# Patient Record
Sex: FEMALE | Race: WHITE | NOT HISPANIC OR LATINO | Employment: FULL TIME | URBAN - METROPOLITAN AREA
[De-identification: names, ages, dates, MRNs, and addresses within clinical notes are randomized per-mention and may not be internally consistent; named-entity substitution may affect disease eponyms.]

---
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
## 2025-01-21 ENCOUNTER — APPOINTMENT (OUTPATIENT)
Dept: OBSTETRICS AND GYNECOLOGY | Facility: CLINIC | Age: 20
End: 2025-01-21
Payer: COMMERCIAL

## 2025-01-21 VITALS
SYSTOLIC BLOOD PRESSURE: 122 MMHG | DIASTOLIC BLOOD PRESSURE: 81 MMHG | WEIGHT: 193 LBS | BODY MASS INDEX: 32.95 KG/M2 | HEIGHT: 64 IN

## 2025-01-21 DIAGNOSIS — Z11.3 ROUTINE SCREENING FOR STI (SEXUALLY TRANSMITTED INFECTION): ICD-10-CM

## 2025-01-21 DIAGNOSIS — N92.0 MENORRHAGIA WITH REGULAR CYCLE: Primary | ICD-10-CM

## 2025-01-21 DIAGNOSIS — N94.6 DYSMENORRHEA: ICD-10-CM

## 2025-01-21 DIAGNOSIS — N94.10 DYSPAREUNIA IN FEMALE: ICD-10-CM

## 2025-01-21 DIAGNOSIS — N76.0 ACUTE VAGINITIS: ICD-10-CM

## 2025-01-21 PROCEDURE — 87491 CHLMYD TRACH DNA AMP PROBE: CPT

## 2025-01-21 PROCEDURE — 87661 TRICHOMONAS VAGINALIS AMPLIF: CPT

## 2025-01-21 PROCEDURE — 1036F TOBACCO NON-USER: CPT | Performed by: ADVANCED PRACTICE MIDWIFE

## 2025-01-21 PROCEDURE — 87591 N.GONORRHOEAE DNA AMP PROB: CPT

## 2025-01-21 PROCEDURE — 3008F BODY MASS INDEX DOCD: CPT | Performed by: ADVANCED PRACTICE MIDWIFE

## 2025-01-21 PROCEDURE — 87205 SMEAR GRAM STAIN: CPT

## 2025-01-21 PROCEDURE — 99203 OFFICE O/P NEW LOW 30 MIN: CPT | Performed by: ADVANCED PRACTICE MIDWIFE

## 2025-01-21 RX ORDER — ETONOGESTREL AND ETHINYL ESTRADIOL VAGINAL RING .015; .12 MG/D; MG/D
RING VAGINAL
Qty: 3 EACH | Refills: 4 | Status: CANCELLED | OUTPATIENT
Start: 2025-01-21

## 2025-01-21 NOTE — PROGRESS NOTES
"Assessment/Plan   Nela was seen today for annual exam.  Diagnoses and all orders for this visit:  Menorrhagia with regular cycle  Routine screening for STI (sexually transmitted infection)  -     C. trachomatis / N. gonorrhoeae, Amplified  -     Trichomonas vaginalis, Amplified  Dyspareunia in female  -     Referral to Physical Therapy; Future  Acute vaginitis  -     Vaginitis Gram Stain For Bacterial Vaginosis + Yeast    Discussed treatments for menorrhagia and dysmenorrhea, including lifestyle modifications, ibuprofen 600mg q 6hrs, hormonal contraceptives  Explored progesterone only hormonal contraceptives as migraines worsen on CHCs, pt is considering the IUD    Dyspareunia, rule out vaginitis and STI, recommended and pt accepts pelvic floor PT    Follow up per results  RTC annual exam or sooner PRN  Due for initial PAP age 19yo    Subjective   Nela Young is a 19 y.o. female who presents for Annual/establish care    Heavy bleeding with periods - pads soaks q2-3 hours x4 days, bleeds 10 days  Associated menstrual symptoms: pain and headaches  Home therapies: heating pads, sometimes will use pain medication bc doesn't work that well  Hx migraines, worse with CHC birth control, usually with periods but sometimes independent of cycle  Pain with intercourse, past 2 years with same partner; prior was not pleasant but not consistent either; use lubrication    Menstrual History:  OB History          0    Para   0    Term   0       0    AB   0    Living   0         SAB   0    IAB   0    Ectopic   0    Multiple   0    Live Births   0               Patient's last menstrual period was 2025.         ROS as in HPI    Objective   /81 (BP Location: Right arm, Patient Position: Sitting, BP Cuff Size: Adult)   Ht 1.626 m (5' 4\")   Wt 87.5 kg (193 lb)   LMP 2025   BMI 33.13 kg/m²     Physical Exam  Pulmonary:      Effort: Pulmonary effort is normal.   Abdominal:      Tenderness: There is no " abdominal tenderness.   Genitourinary:     General: Normal vulva.      Vagina: Normal.      Cervix: Normal.      Uterus: Normal.       Adnexa: Right adnexa normal and left adnexa normal.   Neurological:      Mental Status: She is alert.

## 2025-01-22 LAB — T VAGINALIS RRNA SPEC QL NAA+PROBE: NEGATIVE

## 2025-01-23 LAB
C TRACH RRNA SPEC QL NAA+PROBE: NEGATIVE
CLUE CELLS VAG LPF-#/AREA: NORMAL /[LPF]
N GONORRHOEA DNA SPEC QL PROBE+SIG AMP: NEGATIVE
NUGENT SCORE: 0
YEAST VAG WET PREP-#/AREA: NORMAL

## 2025-02-25 ENCOUNTER — APPOINTMENT (OUTPATIENT)
Dept: PRIMARY CARE | Facility: CLINIC | Age: 20
End: 2025-02-25
Payer: COMMERCIAL

## 2025-02-25 VITALS
HEIGHT: 65 IN | DIASTOLIC BLOOD PRESSURE: 72 MMHG | HEART RATE: 81 BPM | RESPIRATION RATE: 16 BRPM | TEMPERATURE: 96.6 F | OXYGEN SATURATION: 98 % | SYSTOLIC BLOOD PRESSURE: 112 MMHG | BODY MASS INDEX: 30.66 KG/M2 | WEIGHT: 184 LBS

## 2025-02-25 DIAGNOSIS — Z83.3 FAMILY HISTORY OF DIABETES MELLITUS: ICD-10-CM

## 2025-02-25 DIAGNOSIS — Z00.00 WELL ADULT EXAM: Primary | ICD-10-CM

## 2025-02-25 DIAGNOSIS — R53.83 FATIGUE, UNSPECIFIED TYPE: ICD-10-CM

## 2025-02-25 PROCEDURE — 99395 PREV VISIT EST AGE 18-39: CPT | Performed by: FAMILY MEDICINE

## 2025-02-25 ASSESSMENT — ENCOUNTER SYMPTOMS
HEADACHES: 0
POLYPHAGIA: 0
VOMITING: 0
FATIGUE: 0
POLYDIPSIA: 0
CHEST TIGHTNESS: 0
NUMBNESS: 0
NAUSEA: 0
MYALGIAS: 0
ARTHRALGIAS: 0
DIZZINESS: 0
DIARRHEA: 0
WEAKNESS: 0
APPETITE CHANGE: 0
SHORTNESS OF BREATH: 0
FREQUENCY: 0

## 2025-02-25 ASSESSMENT — PATIENT HEALTH QUESTIONNAIRE - PHQ9
SUM OF ALL RESPONSES TO PHQ9 QUESTIONS 1 AND 2: 0
2. FEELING DOWN, DEPRESSED OR HOPELESS: NOT AT ALL
1. LITTLE INTEREST OR PLEASURE IN DOING THINGS: NOT AT ALL

## 2025-02-25 NOTE — PROGRESS NOTES
"Patient reported health Good    Regular Dental Visits no    Regular Eye Visits yes    Hearing Loss no    Balanced Diet yes    Weight Concerns  gain of 60 pds in the last 6 months    Exercise Irregular     Subjective   Patient ID: Nela Young is a 19 y.o. female who presents for Annual Exam.    HPI     Review of Systems   Constitutional:  Negative for appetite change and fatigue.   Eyes:  Negative for visual disturbance.   Respiratory:  Negative for chest tightness and shortness of breath.    Cardiovascular:  Negative for chest pain and leg swelling.   Gastrointestinal:  Negative for diarrhea, nausea and vomiting.   Endocrine: Negative for polydipsia, polyphagia and polyuria.   Genitourinary:  Negative for frequency and urgency.   Musculoskeletal:  Negative for arthralgias and myalgias.   Neurological:  Negative for dizziness, syncope, weakness, numbness and headaches.       Objective   /72   Pulse 81   Temp 35.9 °C (96.6 °F)   Resp 16   Ht 1.638 m (5' 4.5\")   Wt 83.5 kg (184 lb)   SpO2 98%   BMI 31.10 kg/m²     Physical Exam  Constitutional:       Appearance: Normal appearance.   HENT:      Head: Normocephalic.      Right Ear: Tympanic membrane, ear canal and external ear normal.      Left Ear: Tympanic membrane, ear canal and external ear normal.      Nose: Nose normal.      Mouth/Throat:      Mouth: Mucous membranes are moist.      Pharynx: Oropharynx is clear.   Eyes:      Conjunctiva/sclera: Conjunctivae normal.   Cardiovascular:      Rate and Rhythm: Normal rate and regular rhythm.   Pulmonary:      Effort: Pulmonary effort is normal.      Breath sounds: Normal breath sounds.   Abdominal:      Tenderness: There is no abdominal tenderness.   Musculoskeletal:         General: Normal range of motion.      Cervical back: Neck supple.   Skin:     General: Skin is warm and dry.   Neurological:      General: No focal deficit present.      Mental Status: She is alert and oriented to person, place, and " time.   Psychiatric:         Mood and Affect: Mood normal.         Assessment/Plan   Problem List Items Addressed This Visit    None  Visit Diagnoses         Codes    Well adult exam    -  Primary Z00.00    Relevant Orders    Follow Up In [facility] - PCP    CBC and Auto Differential    Comprehensive Metabolic Panel    Hemoglobin A1C    Lipid Panel    Thyroid Stimulating Hormone    T4, free    Fatigue, unspecified type     R53.83    Relevant Orders    Follow Up In [facility] - PCP    CBC and Auto Differential    Comprehensive Metabolic Panel    Hemoglobin A1C    Lipid Panel    Thyroid Stimulating Hormone    T4, free    Family history of diabetes mellitus     Z83.3    Relevant Orders    Follow Up In [facility] - PCP    CBC and Auto Differential    Comprehensive Metabolic Panel    Hemoglobin A1C    Lipid Panel    Thyroid Stimulating Hormone    T4, free

## 2025-04-06 LAB
ALBUMIN SERPL-MCNC: 4.5 G/DL (ref 3.6–5.1)
ALP SERPL-CCNC: 101 U/L (ref 31–125)
ALT SERPL-CCNC: 18 U/L (ref 6–29)
ANION GAP SERPL CALCULATED.4IONS-SCNC: 8 MMOL/L (CALC) (ref 7–17)
AST SERPL-CCNC: 18 U/L (ref 10–30)
BASOPHILS # BLD AUTO: 29 CELLS/UL (ref 0–200)
BASOPHILS NFR BLD AUTO: 0.4 %
BILIRUB SERPL-MCNC: 0.3 MG/DL (ref 0.2–1.2)
BUN SERPL-MCNC: 13 MG/DL (ref 7–25)
CALCIUM SERPL-MCNC: 9.8 MG/DL (ref 8.6–10.2)
CHLORIDE SERPL-SCNC: 105 MMOL/L (ref 98–110)
CHOLEST SERPL-MCNC: 141 MG/DL
CHOLEST/HDLC SERPL: 2.6 (CALC)
CO2 SERPL-SCNC: 26 MMOL/L (ref 20–32)
CREAT SERPL-MCNC: 0.69 MG/DL (ref 0.5–0.96)
EGFRCR SERPLBLD CKD-EPI 2021: 127 ML/MIN/1.73M2
EOSINOPHIL # BLD AUTO: 212 CELLS/UL (ref 15–500)
EOSINOPHIL NFR BLD AUTO: 2.9 %
ERYTHROCYTE [DISTWIDTH] IN BLOOD BY AUTOMATED COUNT: 13.1 % (ref 11–15)
EST. AVERAGE GLUCOSE BLD GHB EST-MCNC: 111 MG/DL
EST. AVERAGE GLUCOSE BLD GHB EST-SCNC: 6.2 MMOL/L
GLUCOSE SERPL-MCNC: 94 MG/DL (ref 65–99)
HBA1C MFR BLD: 5.5 % OF TOTAL HGB
HCT VFR BLD AUTO: 39.5 % (ref 35–45)
HDLC SERPL-MCNC: 55 MG/DL
HGB BLD-MCNC: 13.1 G/DL (ref 11.7–15.5)
LDLC SERPL CALC-MCNC: 67 MG/DL (CALC)
LYMPHOCYTES # BLD AUTO: 2285 CELLS/UL (ref 850–3900)
LYMPHOCYTES NFR BLD AUTO: 31.3 %
MCH RBC QN AUTO: 28.4 PG (ref 27–33)
MCHC RBC AUTO-ENTMCNC: 33.2 G/DL (ref 32–36)
MCV RBC AUTO: 85.7 FL (ref 80–100)
MONOCYTES # BLD AUTO: 511 CELLS/UL (ref 200–950)
MONOCYTES NFR BLD AUTO: 7 %
NEUTROPHILS # BLD AUTO: 4263 CELLS/UL (ref 1500–7800)
NEUTROPHILS NFR BLD AUTO: 58.4 %
NONHDLC SERPL-MCNC: 86 MG/DL (CALC)
PLATELET # BLD AUTO: 458 THOUSAND/UL (ref 140–400)
PMV BLD REES-ECKER: 9.8 FL (ref 7.5–12.5)
POTASSIUM SERPL-SCNC: 4.8 MMOL/L (ref 3.5–5.3)
PROT SERPL-MCNC: 7.5 G/DL (ref 6.1–8.1)
RBC # BLD AUTO: 4.61 MILLION/UL (ref 3.8–5.1)
SODIUM SERPL-SCNC: 139 MMOL/L (ref 135–146)
T4 FREE SERPL-MCNC: 0.9 NG/DL (ref 0.8–1.4)
TRIGL SERPL-MCNC: 105 MG/DL
TSH SERPL-ACNC: 1.38 MIU/L
WBC # BLD AUTO: 7.3 THOUSAND/UL (ref 3.8–10.8)

## 2025-04-07 ENCOUNTER — TELEPHONE (OUTPATIENT)
Dept: PRIMARY CARE | Facility: CLINIC | Age: 20
End: 2025-04-07
Payer: COMMERCIAL

## 2025-04-07 NOTE — TELEPHONE ENCOUNTER
----- Message from Lul Petty sent at 4/7/2025  9:28 AM EDT -----  Platelets were a little elevated not worrisome we will keep an eye on it the rest of her labs were normal

## 2025-05-07 ENCOUNTER — EVALUATION (OUTPATIENT)
Dept: PHYSICAL THERAPY | Facility: CLINIC | Age: 20
End: 2025-05-07
Payer: COMMERCIAL

## 2025-05-07 DIAGNOSIS — N94.10 DYSPAREUNIA IN FEMALE: ICD-10-CM

## 2025-05-07 PROCEDURE — 97110 THERAPEUTIC EXERCISES: CPT | Mod: GP | Performed by: PHYSICAL THERAPIST

## 2025-05-07 PROCEDURE — 97161 PT EVAL LOW COMPLEX 20 MIN: CPT | Mod: GP | Performed by: PHYSICAL THERAPIST

## 2025-05-07 ASSESSMENT — PATIENT HEALTH QUESTIONNAIRE - PHQ9
SUM OF ALL RESPONSES TO PHQ9 QUESTIONS 1 AND 2: 0
1. LITTLE INTEREST OR PLEASURE IN DOING THINGS: NOT AT ALL
2. FEELING DOWN, DEPRESSED OR HOPELESS: NOT AT ALL

## 2025-05-07 NOTE — PROGRESS NOTES
Physical Therapy Evaluation and Treatment      Patient Name: Nela Young  MRN: [id number]  Today's Date: 5/7/2025  Time Calculation  Start Time: 0750  Stop Time: 0843  Time Calculation (min): 53 min      PT Evaluation Time Entry  PT Evaluation (Low) Time Entry: 30  PT Therapeutic Procedures Time Entry  Therapeutic Exercise Time Entry: 23                   INSURANCE:  Visit number: 1/6  [facility] BMN PT OT COPAY 75   DED 2000(235) COVERAGE 100 OOP 5500(235)  NO AUTH REQ REF# [id number]     Referring Provider: SE Bingham    ASSESSMENT:  PT Assessment Results: decreased knowledge of HEP, activity limitations, ADLs/IADLs/self care skills, flexibility, motor function/control/tone, pain, participation restrictions, range of motion/joint mobility, strength, posture, transfers, coordination, balance, gait/locomotion, decreased knowledge of precautions.  Rehab Prognosis: Good  Evaluation/Treatment Tolerance: Patient tolerated treatment well  Stable and/or uncomplicated characteristics    Nela Young is a 19 y.o. female presenting to the clinic with dyspareunia and high tone pelvic floor. Pt demonstrates decreased ROM and strength of the pelvis/B hips and abdominals causing pain and dysfunction with toileting, intercourse, sitting too long, work, bending, and lifting. Pt was given and reviewed HEP. The patient was educated on the importance of general therapeutic exercise, anatomy, function, & likely cause of impairments. Pt will benefit from skilled PT in order to increase ROM and strength of the pelvis/B hips and abdominals so that the pt can perform ADLs without pain and return to PLOF.     PLAN:  Treatments/Interventions: traction, gait training, dry needling, edema control, education/instruction, home program, self care/home management, manual therapy, neuromuscular re-education, therapeutic activities, therapeutic exercises, modalities, therapeutic elastic taping.   PT Plan:  Skilled PT  PT Frequency: 1 time per week  Duration: 6 visits  Onset Date: 05/07/23  Rehab Potential: Good  Plan of Care Agreement: Patient    Goals -    STG - After about 6 weeks:  Pt will report less than a 4/10 pain at the worst.  Pt will be able to manage changes in intra-abdominal pressure with appropriate pelvic floor and TA muscle activation.  Pt will be able to coordinate pelvic floor with thoracic diaphragm during functional activities that cause symptoms.  Pt will increase by 1 MMT grade for B hips.  Pt will increase BM frequency to at least twice a week.    LTG - after about 12 weeks:  Pt will report less than a 0-2/10 pain at the worst.  Pt will have at least 4+/5 to 5/5 strength for B hips.   Pt will have AROM to WFL for the lumbar spine.   Pt will report a significant improvement with Female NIH-CPSI score by 5 points (MDC).  Pt will increase BM frequency to at least once every other day.  Pt will be independent with progressed HEP.    CURRENT PROBLEM:   1. Dyspareunia in female  Referral to Physical Therapy    Follow Up In Physical Therapy          Subjective      PELVIC HISTORY:  History of Current Episode/Chief Complaint -  Pt states that for years she has had really bad period cramps. But then for the past 1-2 years she has been having pain with sex every time. Pt reports that it it does not matter the position or how much lubricant she uses, she always had painful intercourse. Pt states that she is looking to eventually get pregnant, so she would like to have this fixed so that she is not in pain when she is trying.    Date Onset - 2 years ago (5/7/23)    Mechanism of Injury -    Insidious Onset    Pelvic Pain -   Pain when emptying bladder: No, but sometimes feels tight after urination  Pain with wiping or tight clothing: No  Pain with intercourse: Yes  History of back pain: Yes    Pain Location: pelvic pain, lumbar and hips/tailbone  Pain Today: 0/10  Pain Worst: 6-7/10 low back/hips, 9/10  pelvic during intercourse  Pain Type: Intermittent, Achy/Dull, Sharp, Stiffness/Tightness  Pain Exacerbating Factors: intercourse, sitting too long, work, bending, and lifting.   Pain Relieving Factors: Rest, heat  Difficulty Sleeping: No  Night Sweats, Loss of Appetite, Unexpected Weight-loss: No  Saddle Anesthesia:  No    Pelvic Exercise -   Do you do Kegels? Yes, just when she thinks about it  Current exercise regime: walking    Bladder Hx -   Excessive Urinary Urgency: No  Water Consumption a day: Yes  Daytime Voiding Frequency: 5-7  Nighttime Voiding Frequency: 0  Unintentional urine loss frequency: No  Difficulty initiating flow of urine: No  Slow/weak urine stream: No  Do you push to urinate: No  Able to completely empty bladder: Not for sure    Bowel Hx -   Excessive Bowel Urgency: No  BM Frequency: Once a week  Frequent Diarrhea: No  Frequent constipation/straining/incomplete emptying: Yes  Supplements: No  Unintentional stool loss: No    Work -   Work Status: Full Time -   Part time -  Job    Home Living -    Pt lives with her roommate.    Patient Stated Goals -   Pt wants to be able to get pregnant  Reduced pain during sex  Reduce constipation    PRECAUTIONS -    None    Prior Level of Function -    I with ADLs/IADLs     Objective     Hip AROM (degrees) - WFL grossly except reduced extension    Hip MMT (/5) -  R hip Flexion: 4+   R hip ER: 4   R hip IR: 4+   R hip Abduction: 4   L hip Flexion: 4+    L hip ER: 4-   L hip IR: 4   L hip Abduction: 4-     Lumbar AROM -   Lumbar Flexion: 80% with pain  Lumbar Extension: 60%   R Lumbar Side Bendin% slight pulling L  L Lumbar Side Bendin%    Posture -   Rounded Shoulders  Forward Head  Increased Lumbar Lordosis/APT  Sway Back Posture    Functional Assessment/Special Tests -  Trendelenburg positive bilateral  Breathing: Chest breath, edu on diaphragmatic breathing  MICHAEL positive right  Lul positive bilateral  SLR negative  bilateral    Flexibility -   Decreased Tissue Length of: Iliopsoas, Adductors, HS B    Palpation - Consent to External Palpation Verbally Given  Diastasis Recti: none  Pelvic Alignment: R anteriorly rotated innominate - corrected with MET    Internal Palpation Exam - Plan to perform next visit with patient consent    Outcome Measures -   Other Measures  Other Outcome Measures: Female NIH-CPSI: 25 (Pain 15, Urinary 4, QOL 6)     Treatment -   Evaluation - Low (97584)    Therapeutic Exercise (12475) -     SI Joint Isometric MET: 5 sec x5  Diaphragmatic Breathing Groundin min  Modified Lul Stretch: 30 sec B  Bridges 3-ways: x10 ea  Supine Piriformis Stretch (pull): 30 sec x2 B  Seated Correct Posture: reviewed  Education on Elimination Position and Colonic ILU Massage    OP Patient Education -   Access Code: [email or web address]  URL: [email or web address]  Date: 2025  Prepared by: Shauna Haynes    Exercises  - Hooklying Sacroiliac Joint Isometric (Mirrored)  - 1 x daily - 2 sets - 10 reps - 5 sec hold  - Diaphragmatic Breathing Grounding  - 1-2 x daily  - Modified Lul Stretch  - 1-2 x daily - 3 sets - 30 seconds hold  - Bridge on Heels  - 1-2 x daily - 1-2 sets - 10-15 reps - 1-2 sec hold  - Supine Bridge with Hip External Rotation  - 1-2 x daily - 1-2 sets - 10-15 reps - 1-2 sec hold  - Supine Internal Rotation Hip Bridge  - 1-2 x daily - 1-2 sets - 10-15 reps - 1-2 sec hold  - Supine Piriformis Stretch with Foot on Ground  - 1-2 x daily - 3 sets - 30 sec hold  - Seated Correct Posture      Handouts  - Elimination Position Handout   - Colonic ILU Massage Handout

## 2025-05-20 ENCOUNTER — APPOINTMENT (OUTPATIENT)
Dept: OBSTETRICS AND GYNECOLOGY | Facility: CLINIC | Age: 20
End: 2025-05-20
Payer: COMMERCIAL

## 2025-05-28 ENCOUNTER — APPOINTMENT (OUTPATIENT)
Dept: PHYSICAL THERAPY | Facility: CLINIC | Age: 20
End: 2025-05-28
Payer: COMMERCIAL

## 2025-06-03 ENCOUNTER — APPOINTMENT (OUTPATIENT)
Dept: PHYSICAL THERAPY | Facility: CLINIC | Age: 20
End: 2025-06-03
Payer: COMMERCIAL

## 2025-06-11 ENCOUNTER — APPOINTMENT (OUTPATIENT)
Dept: PHYSICAL THERAPY | Facility: CLINIC | Age: 20
End: 2025-06-11
Payer: COMMERCIAL

## 2025-06-18 ENCOUNTER — APPOINTMENT (OUTPATIENT)
Dept: PHYSICAL THERAPY | Facility: CLINIC | Age: 20
End: 2025-06-18
Payer: COMMERCIAL

## 2025-06-25 ENCOUNTER — APPOINTMENT (OUTPATIENT)
Dept: PHYSICAL THERAPY | Facility: CLINIC | Age: 20
End: 2025-06-25
Payer: COMMERCIAL

## 2025-07-02 ENCOUNTER — APPOINTMENT (OUTPATIENT)
Dept: PHYSICAL THERAPY | Facility: CLINIC | Age: 20
End: 2025-07-02
Payer: COMMERCIAL

## 2025-07-30 ENCOUNTER — APPOINTMENT (OUTPATIENT)
Dept: RADIOLOGY | Facility: HOSPITAL | Age: 20
End: 2025-07-30
Payer: COMMERCIAL

## 2025-07-30 ENCOUNTER — HOSPITAL ENCOUNTER (EMERGENCY)
Facility: HOSPITAL | Age: 20
Discharge: HOME | End: 2025-07-30
Payer: COMMERCIAL

## 2025-07-30 VITALS
SYSTOLIC BLOOD PRESSURE: 154 MMHG | BODY MASS INDEX: 31.41 KG/M2 | TEMPERATURE: 97.7 F | RESPIRATION RATE: 20 BRPM | HEIGHT: 64 IN | WEIGHT: 184 LBS | OXYGEN SATURATION: 99 % | HEART RATE: 95 BPM | DIASTOLIC BLOOD PRESSURE: 74 MMHG

## 2025-07-30 DIAGNOSIS — S90.31XA CONTUSION OF RIGHT FOOT, INITIAL ENCOUNTER: Primary | ICD-10-CM

## 2025-07-30 PROCEDURE — 99283 EMERGENCY DEPT VISIT LOW MDM: CPT

## 2025-07-30 PROCEDURE — 73630 X-RAY EXAM OF FOOT: CPT | Mod: RT

## 2025-07-30 PROCEDURE — 73630 X-RAY EXAM OF FOOT: CPT | Mod: RIGHT SIDE | Performed by: STUDENT IN AN ORGANIZED HEALTH CARE EDUCATION/TRAINING PROGRAM

## 2025-07-30 ASSESSMENT — PAIN DESCRIPTION - FREQUENCY: FREQUENCY: CONSTANT/CONTINUOUS

## 2025-07-30 ASSESSMENT — PAIN DESCRIPTION - DESCRIPTORS: DESCRIPTORS: ACHING

## 2025-07-30 ASSESSMENT — PAIN DESCRIPTION - ONSET: ONSET: SUDDEN

## 2025-07-30 ASSESSMENT — PAIN DESCRIPTION - PAIN TYPE: TYPE: ACUTE PAIN

## 2025-07-30 ASSESSMENT — PAIN SCALES - GENERAL: PAINLEVEL_OUTOF10: 5 - MODERATE PAIN

## 2025-07-30 ASSESSMENT — PAIN DESCRIPTION - ORIENTATION: ORIENTATION: RIGHT

## 2025-07-30 ASSESSMENT — PAIN DESCRIPTION - PROGRESSION: CLINICAL_PROGRESSION: NOT CHANGED

## 2025-07-30 ASSESSMENT — PAIN - FUNCTIONAL ASSESSMENT: PAIN_FUNCTIONAL_ASSESSMENT: 0-10

## 2025-07-30 ASSESSMENT — PAIN DESCRIPTION - LOCATION: LOCATION: FOOT

## 2025-07-30 NOTE — ED PROVIDER NOTES
HPI   Chief Complaint   Patient presents with    Foot Injury     Right foot pain.       A 20-year-old female patient comes in the emergency department today with complaints of right foot pain.  Says she jumped on a swing pole about a week ago struck her foot on the ball pole quite hard it has been having pain since.  States it really has not been improving.  Pain is worse with ambulation.  She rates the pain a 5 out of 10 on the pain scale.  States the pain is focused at the ball of her foot.  For this purpose she comes in the emergency department today for further evaluation.  Otherwise no other complaints present time.                  Patient History   Medical History[1]  Surgical History[2]  Family History[3]  Social History[4]    Physical Exam   ED Triage Vitals [07/30/25 1755]   Temperature Heart Rate Respirations BP   36.5 °C (97.7 °F) 95 20 154/74      Pulse Ox Temp Source Heart Rate Source Patient Position   99 % Temporal Monitor Sitting      BP Location FiO2 (%)     Right arm --       Physical Exam  Constitutional:       General: She is in acute distress.      Appearance: Normal appearance. She is not ill-appearing or diaphoretic.   HENT:      Head: Normocephalic and atraumatic.      Nose: Nose normal.     Eyes:      Extraocular Movements: Extraocular movements intact.      Conjunctiva/sclera: Conjunctivae normal.      Pupils: Pupils are equal, round, and reactive to light.       Cardiovascular:      Rate and Rhythm: Normal rate and regular rhythm.   Pulmonary:      Effort: Pulmonary effort is normal. No respiratory distress.      Breath sounds: Normal breath sounds. No stridor. No wheezing.     Musculoskeletal:         General: Tenderness (Plantar aspect of the right distal foot) present. Normal range of motion.      Cervical back: Normal range of motion.     Skin:     General: Skin is warm and dry.     Neurological:      General: No focal deficit present.      Mental Status: She is alert and oriented to  person, place, and time. Mental status is at baseline.     Psychiatric:         Mood and Affect: Mood normal.           ED Course & MDM   Diagnoses as of 07/30/25 1901   Contusion of right foot, initial encounter                 No data recorded     Harrison Coma Scale Score: 15 (07/30/25 1752 : Tano Agustin RN)                           Medical Decision Making  A 20-year-old female patient comes in the emergency department today with complaints of right foot pain.  Says she jumped on a swing pole about a week ago struck her foot on the ball pole quite hard it has been having pain since.  States it really has not been improving.  Pain is worse with ambulation.  She rates the pain a 5 out of 10 on the pain scale.  States the pain is focused at the ball of her foot.  For this purpose she comes in the emergency department today for further evaluation.  Otherwise no other complaints present time.    X-ray of the right foot ordered to rule out any acute bony abnormality, fracture.    Patient has negative radiologic findings here in the emergency department today.    I offered crutches patient declines.  Will give patient orthopedic follow-up.  Patient agrees with this plan expressed verbal understanding.  Questions were answered.    Historians patient    Diagnosis: Right foot contusion      Labs Reviewed - No data to display     XR foot right 3+ views   Final Result   Normal right foot radiographs.        MACRO:   None        Signed by: Lokesh Connors 7/30/2025 6:25 PM   Dictation workstation:   [id number]          Procedure  Procedures       [1]   Past Medical History:  Diagnosis Date    Generalized abdominal pain 06/17/2014    Generalized abdominal pain    Personal history of other diseases of the digestive system 06/17/2014    History of constipation    Personal history of other diseases of the nervous system and sense organs 06/17/2014    History of blurred vision    Personal history of other diseases of the  respiratory system     Personal history of asthma    Personal history of other specified conditions     History of headache    Personal history of other specified conditions 06/17/2014    History of vomiting   [2]   Past Surgical History:  Procedure Laterality Date    OTHER SURGICAL HISTORY  07/15/2020    No history of surgery   [3]   Family History  Problem Relation Name Age of Onset    Diabetes Father      Diverticulosis Other      Arthritis Other      Asthma Other      Clotting disorder Other      Celiac disease Other      Other (Cardiac Disorder) Other      Cholelithiasis Other      Ulcers Other      Hyperlipidemia Other      Hypertension Other      Kidney nephrosis Other      Stroke Other      Thyroid disease Other     [4]   Social History  Tobacco Use    Smoking status: Never    Smokeless tobacco: Never   Vaping Use    Vaping status: Former   Substance Use Topics    Alcohol use: Never    Drug use: Never        Cameron Garcia PA-C  07/30/25 [id number]

## 2025-07-30 NOTE — Clinical Note
Nela Young was seen and treated in our emergency department on 7/30/2025.  She may return to work on 08/01/2025.       If you have any questions or concerns, please don't hesitate to call.      Cameron Garcia PA-C

## 2026-02-26 ENCOUNTER — APPOINTMENT (OUTPATIENT)
Dept: PRIMARY CARE | Facility: CLINIC | Age: 21
End: 2026-02-26
Payer: COMMERCIAL